# Patient Record
Sex: MALE
[De-identification: names, ages, dates, MRNs, and addresses within clinical notes are randomized per-mention and may not be internally consistent; named-entity substitution may affect disease eponyms.]

---

## 2021-12-08 ENCOUNTER — HOSPITAL ENCOUNTER (EMERGENCY)
Dept: HOSPITAL 11 - JP.ED | Age: 36
Discharge: HOME | End: 2021-12-08
Payer: MEDICAID

## 2021-12-08 DIAGNOSIS — F41.0: Primary | ICD-10-CM

## 2021-12-08 DIAGNOSIS — Z72.0: ICD-10-CM

## 2021-12-08 PROCEDURE — 99284 EMERGENCY DEPT VISIT MOD MDM: CPT

## 2021-12-08 NOTE — EDM.PDOC
ED HPI GENERAL MEDICAL PROBLEM





- General


Chief Complaint: General


Stated Complaint: MED VIA NORTH


Time Seen by Provider: 12/08/21 20:58


Source of Information: Reports: Patient, RN Notes Reviewed


History Limitations: Reports: No Limitations





- History of Present Illness


INITIAL COMMENTS - FREE TEXT/NARRATIVE: 





35-year-old gentleman presents emergency department today via EMS services 

complaint of cannot breathe chest pain feeling of impending doom going to die.  

He does admit he has high anxiety he was recently taken off his Klonopin is 

currently having a panic attack which she has multiple times per year





- Related Data


                                    Allergies











Allergy/AdvReac Type Severity Reaction Status Date / Time


 


No Known Allergies Allergy   Verified 12/08/21 20:05











Home Meds: 


                                    Home Meds





DULoxetine [Cymbalta] 60 mg PO DAILY 12/08/21 [History]


Gabapentin [Neurontin] 800 mg PO TID 12/08/21 [History]











Past Medical History


Musculoskeletal History: Reports: Back Pain, Chronic, Fracture


Psychiatric History: Reports: Anxiety, Depression





Social & Family History





- Tobacco Use


Tobacco Use Status *Q: Current Every Day Tobacco User


Years of Tobacco use: 4


Packs/Tins Daily: 2





- Caffeine Use


Caffeine Use: Reports: Energy Drinks


Caffeine Use Comment: 2 energy drinks per day





- Recreational Drug Use


Recreational Drug Use: Yes


Drug Use in Last 12 Months: Yes


Recreational Drug Type: Reports: Marijuana/Hashish


Recreational Drug Use Frequency: Socially





ED ROS GENERAL





- Review of Systems


Review Of Systems: See Below


Constitutional: Reports: No Symptoms


HEENT: Reports: No Symptoms


Respiratory: Reports: Shortness of Breath


Cardiovascular: Reports: Chest Pain


GI/Abdominal: Reports: No Symptoms


Neurological: Reports: Trouble Speaking


Psychiatric: Reports: Anxiety





ED EXAM, GENERAL





- Physical Exam


Exam: See Below


Exam Limited By: No Limitations


General Appearance: Alert, WD/WN, No Apparent Distress


Respiratory/Chest: No Respiratory Distress


Neurological: Alert, Oriented, CN II-XII Intact, Normal Gait, No Motor/Sensory 

Deficits


Psychiatric: Anxious





Course





- Vital Signs


Last Recorded V/S: 





                                Last Vital Signs











Temp  98.0 F   12/08/21 20:10


 


Pulse  122 H  12/08/21 20:10


 


Resp  24 H  12/08/21 20:10


 


BP  167/94 H  12/08/21 20:10


 


Pulse Ox  100   12/08/21 20:10














- Orders/Labs/Meds


Orders: 





                               Active Orders 24 hr











 Category Date Time Status


 


 COVID-19/FLU A+B/RSV [[facility]LEC] Stat Lab  12/08/21 19:53 Ordered


 


 Isolation [COMM] Stat Oth  12/08/21 19:54 Ordered











Meds: 





Medications














Discontinued Medications














Generic Name Dose Route Start Last Admin





  Trade Name Terry  PRN Reason Stop Dose Admin


 


Lorazepam  1 mg  12/08/21 20:14  12/08/21 20:25





  Lorazepam 1 Mg Tab  PO  12/08/21 20:15  1 mg





  ONETIME ONE   Administration














Departure





- Departure


Time of Disposition: 21:00


Disposition: Home, Self-Care 01


Condition: Fair


Clinical Impression: 


 Panic attack








- Discharge Information


Instructions:  Panic Attack, Easy-to-Read


Referrals: 


PCP,None [Primary Care Provider] - 


Additional Instructions: 


Please keep your follow-up appointment with your mental health provider tomorrow

call return to the emergency department worsening of symptoms





Sepsis Event Note (ED)





- Evaluation


Sepsis Screening Result: No Definite Risk





- Focused Exam


Vital Signs: 





                                   Vital Signs











  Temp Pulse Resp BP Pulse Ox


 


 12/08/21 20:10  98.0 F  122 H  24 H  167/94 H  100














- My Orders


Last 24 Hours: 





My Active Orders





12/08/21 19:53


COVID-19/FLU A+B/RSV [[facility]LE[facility]] Stat 





12/08/21 19:54


Isolation [COMM] Stat 














- Assessment/Plan


Last 24 Hours: 





My Active Orders





12/08/21 19:53


COVID-19/FLU A+B/RSV [[facility]LEC] Stat 





12/08/21 19:54


Isolation [COMM] Stat 











Plan: 





Assessment





Acuity = acute





Site and laterality = panic attack





Etiology  = underlying anxiety





Manifestations = none





Location of injury =  Home





Lab values = none





Plan


Good improvement 1 mg Ativan he does have follow-up appointment with midlevel 

provider tomorrow

















 This note was dictated using dragon voice recognition software please call with

any questions on syntax or grammar.